# Patient Record
(demographics unavailable — no encounter records)

---

## 2025-01-09 NOTE — HISTORY OF PRESENT ILLNESS
[Doing Well] : Doing well [No Side Effects] : no side effects [FreeTextEntry5] : Graduated  [employer] in [place] with major in Journalism and New Media\par  \par  \par  Currently taking a summer class\par  Doing his work independently\par   [de-identified] : Works full time in person [Major Illness] : no major illness [Major Injury] : no major injury [Surgery] : no surgery [Hospitalizations] : no hospitalizations [New Medications] : no new medication [New Allergies] : no new allergies

## 2025-01-09 NOTE — PLAN
[Med Options Discussed: _____] : - Medication options discussed [unfilled] [Continue present medication regimen _____] : - Continue present medication regimen [unfilled] [Monitor Attention] : - [unfilled]'s attention skills will need to continue to be monitored [Home Behavior Techniques] : - Specific behavioral techniques that can be implemented at home were discussed [[email or web address]] : - [email or web address] - Children and Adults with Attention Deficit Hyperactivity Disorder [Follow-up call: ____] : - Follow-up telephone call: [unfilled]  [Accuracy] : Accuracy and reliability of clinical impressions [Findings (To Date)] : Findings from evaluation (to date) [Clinical Basis] : Clinical basis for current diagnosis and clinical impressions [Differential Diagnosis] : Differential diagnosis [Co-Morbidities] : Clinical disorders and problem commonly associated with this child's condition (now or in the future) [Prognosis] : Prognosis [Goals / Benefits] : Goals & potential benefits of treatment with medication, as well as the limitations of pharmacotherapy [Resources] : Other available resources [Family Questions] : Family's questions were addressed [Diet] : Evidence-based clinical information about diet [Sleep] : The importance of sleep and strategies to ensure adequate sleep [Media / Screen Time] : Importance of limiting electronics, media, and screen time [Exercise] : Regular exercise [Driving] : Safety issues related to driving, including the potential benefits of medication for ADHD

## 2025-01-09 NOTE — REASON FOR VISIT
[Patient] : patient [Mother] : mother [Follow-Up Visit] : a follow-up visit for [ADHD] : ADHD [Response to Medication] : response to medication [Medical records] : medical records